# Patient Record
Sex: FEMALE | Race: BLACK OR AFRICAN AMERICAN
[De-identification: names, ages, dates, MRNs, and addresses within clinical notes are randomized per-mention and may not be internally consistent; named-entity substitution may affect disease eponyms.]

---

## 2019-03-15 ENCOUNTER — HOSPITAL ENCOUNTER (EMERGENCY)
Dept: HOSPITAL 87 - ER | Age: 29
Discharge: LEFT BEFORE DISCHARGE | End: 2019-03-15
Payer: COMMERCIAL

## 2019-03-15 VITALS — BODY MASS INDEX: 29.76 KG/M2 | WEIGHT: 185.19 LBS | HEIGHT: 66 IN

## 2019-03-15 VITALS — DIASTOLIC BLOOD PRESSURE: 85 MMHG | SYSTOLIC BLOOD PRESSURE: 131 MMHG

## 2019-03-15 DIAGNOSIS — R42: Primary | ICD-10-CM

## 2019-03-15 DIAGNOSIS — Z53.21: ICD-10-CM

## 2024-03-18 ENCOUNTER — HOSPITAL ENCOUNTER (EMERGENCY)
Facility: HOSPITAL | Age: 34
Discharge: HOME | End: 2024-03-18
Attending: EMERGENCY MEDICINE
Payer: MEDICAID

## 2024-03-18 VITALS
DIASTOLIC BLOOD PRESSURE: 82 MMHG | RESPIRATION RATE: 17 BRPM | WEIGHT: 250 LBS | TEMPERATURE: 99 F | OXYGEN SATURATION: 100 % | SYSTOLIC BLOOD PRESSURE: 131 MMHG | HEART RATE: 88 BPM

## 2024-03-18 DIAGNOSIS — H92.02 OTALGIA OF LEFT EAR: Primary | ICD-10-CM

## 2024-03-18 LAB
CTP QC/QA: YES
MOLECULAR STREP A: NEGATIVE
POC MOLECULAR INFLUENZA A AGN: NEGATIVE
POC MOLECULAR INFLUENZA B AGN: NEGATIVE
SARS-COV-2 RDRP RESP QL NAA+PROBE: NEGATIVE

## 2024-03-18 PROCEDURE — 87502 INFLUENZA DNA AMP PROBE: CPT

## 2024-03-18 PROCEDURE — 87651 STREP A DNA AMP PROBE: CPT

## 2024-03-18 PROCEDURE — 87635 SARS-COV-2 COVID-19 AMP PRB: CPT | Performed by: EMERGENCY MEDICINE

## 2024-03-18 PROCEDURE — 99282 EMERGENCY DEPT VISIT SF MDM: CPT

## 2024-03-18 RX ORDER — CETIRIZINE HYDROCHLORIDE 10 MG/1
10 TABLET ORAL DAILY
Qty: 30 TABLET | Refills: 0 | Status: SHIPPED | OUTPATIENT
Start: 2024-03-18 | End: 2025-03-18

## 2024-03-18 RX ORDER — PSEUDOEPHEDRINE HCL 120 MG/1
120 TABLET, FILM COATED, EXTENDED RELEASE ORAL 2 TIMES DAILY PRN
Qty: 20 TABLET | Refills: 0 | Status: SHIPPED | OUTPATIENT
Start: 2024-03-18 | End: 2024-03-28

## 2024-03-18 RX ORDER — FLUTICASONE PROPIONATE 50 MCG
2 SPRAY, SUSPENSION (ML) NASAL DAILY
Qty: 15.8 ML | Refills: 0 | Status: SHIPPED | OUTPATIENT
Start: 2024-03-18 | End: 2024-04-17

## 2024-03-18 NOTE — DISCHARGE INSTRUCTIONS

## 2024-03-18 NOTE — ED PROVIDER NOTES
Encounter Date: 3/18/2024    SCRIBE #1 NOTE: I Sara Li, am scribing for, and in the presence of,  Franko Valencia PA-C.       History     Chief Complaint   Patient presents with    Sore Throat     Sore throat, cough, runny nose, L ear pain x3 weeks     32 yo F w/ no PMHx presenting to the ED for 3w hx of persistent sore throat and L ear pain. Also notes nasal congestion. Was evaluated at [facility] in 3/9/24 and was told she had a URI after testing negative for COVID, flu, RSV. She has been attempting Tx w/ tylenol w/out relief. No other exacerbating or alleviating factors. Denies fever, chest pain, dyspnea, or other associated symptoms.  Patient was concerned that she has a Candida auris infection because her cousin told her to ask about it and she has been Googling it.    The history is provided by the patient. No  was used.     Review of patient's allergies indicates:  No Known Allergies  No past medical history on file.  No past surgical history on file.  No family history on file.     Review of Systems   Constitutional:  Negative for chills and fever.   HENT:  Positive for congestion, ear pain and sore throat. Negative for rhinorrhea.    Eyes:  Negative for visual disturbance.   Respiratory:  Negative for cough and shortness of breath.    Cardiovascular:  Negative for chest pain.   Gastrointestinal:  Negative for abdominal pain, diarrhea, nausea and vomiting.   Genitourinary:  Negative for dysuria, frequency and hematuria.   Musculoskeletal:  Negative for back pain.   Skin:  Negative for rash.   Neurological:  Negative for dizziness, weakness and headaches.       Physical Exam     Initial Vitals [03/18/24 1343]   BP Pulse Resp Temp SpO2   131/82 88 17 98.7 °F (37.1 °C) 100 %      MAP       --         Physical Exam    Nursing note and vitals reviewed.  Constitutional: Vital signs are normal. She appears well-developed and well-nourished. She is cooperative. She does not appear ill. No  distress.   Clinically well-appearing.  No acute distress.   HENT:   Head: Normocephalic and atraumatic.   Right Ear: Hearing and external ear normal.   Left Ear: Hearing and external ear normal.   Nose: Nose normal.   Mouth/Throat: Oropharynx is clear and moist and mucous membranes are normal. Mucous membranes are not dry. No oropharyngeal exudate, posterior oropharyngeal edema or posterior oropharyngeal erythema.   Normal exam of the oropharynx.  No oropharyngeal or tonsillar erythema, edema, or exudates.  Bilateral ears clear with no signs of infection.  No middle ear effusion.  Tympanic membranes intact.   Eyes: Conjunctivae and EOM are normal.   Neck: Phonation normal.   Normal range of motion.  Cardiovascular:  Normal rate and regular rhythm.           No murmur heard.  Pulmonary/Chest: Effort normal. No respiratory distress.   Respirations even and unlabored.   Abdominal: Abdomen is soft. She exhibits no distension. There is no abdominal tenderness.   Musculoskeletal:      Cervical back: Normal range of motion.     Neurological: She is alert and oriented to person, place, and time. GCS eye subscore is 4. GCS verbal subscore is 5. GCS motor subscore is 6.   Skin: Skin is warm. Capillary refill takes less than 2 seconds.         ED Course   Procedures  Labs Reviewed   SARS-COV-2 RDRP GENE   POCT INFLUENZA A/B MOLECULAR   POCT STREP A MOLECULAR          Imaging Results    None          Medications - No data to display  Medical Decision Making  33-year-old female presenting to the emergency department with a chief complaint of 3 weeks of sore throat and left ear pain.  Tylenol with no relief.  Denies fever or systemic symptoms.  Concerned for Candida auris infection.  On physical exam, she was clinically well-appearing and in no acute distress.  Vital signs are within normal limits.  Cardiac and lung exams within normal limits.  Examination of the ears and oropharynx unremarkable.      Differential diagnosis  includes but is not limited to strep pharyngitis, viral pharyngitis, peritonsillar abscess, respiratory infections including COVID, flu, bronchitis, rhinosinusitis, or pneumonia, or noninfectious processes such as asthma, COPD or seasonal allergies.    Patient tested negative for COVID, flu, and strep.  No convincing signs of bacterial infection.  No fever.  Likely post viral, or due to seasonal allergies and congestion.  I discussed with the patient that because she was immunocompetent and not systemically ill, the chances of her having a Candida auris infection are very minimal.  She seemed reassured by this.  Medications below electronically prescribed and sent to the patient's preferred pharmacy.  Stable for discharge.    Return precautions were discussed, all patient questions were answered, and the patient was agreeable to the plan of care.  She was discharged home in stable condition and will follow up with her primary care provider or return to the emergency department if her symptoms worsen or do not improve.     Amount and/or Complexity of Data Reviewed  Labs: ordered. Decision-making details documented in ED Course.    Risk  OTC drugs.  Diagnosis or treatment significantly limited by social determinants of health.            Scribe Attestation:   Scribe #1: I performed the above scribed service and the documentation accurately describes the services I performed. I attest to the accuracy of the note.                           I, TESSA Yanes-, personally performed the services described in this documentation. All medical record entries made by the scribe were at my direction and in my presence. I have reviewed the chart and agree that the record reflects my personal performance and is accurate and complete.      Clinical Impression:  Final diagnoses:  [H92.02] Otalgia of left ear (Primary)          ED Disposition Condition    Discharge Stable          ED Prescriptions       Medication Sig Dispense  Start Date End Date Auth. Provider    fluticasone propionate (FLONASE) 50 mcg/actuation nasal spray 2 sprays (100 mcg total) by Each Nostril route once daily. 15.8 mL 3/18/2024 4/17/2024 Franko Valencia PA-C    cetirizine (ZYRTEC) 10 MG tablet Take 1 tablet (10 mg total) by mouth once daily. 30 tablet 3/18/2024 3/18/2025 Franko Valencia PA-C    benzocaine-menthoL 6-10 mg lozenge Take 1 lozenge by mouth every 2 (two) hours as needed. 36 tablet 3/18/2024 -- Franko Valencia PA-C    pseudoephedrine (SUDAFED 12 HOUR) 120 mg 12 hr tablet Take 1 tablet (120 mg total) by mouth 2 (two) times daily as needed for Congestion. 20 tablet 3/18/2024 3/28/2024 Franko Valencia, BECKY          Follow-up Information       Follow up With Specialties Details Why Contact Info    [facility] -  Schedule an appointment as soon as possible for a visit  As needed [address]  [place] [address]  [phone]               Franko Valencia PA-C  03/18/24 7961

## 2024-05-15 ENCOUNTER — HOSPITAL ENCOUNTER (EMERGENCY)
Facility: HOSPITAL | Age: 34
Discharge: HOME | End: 2024-05-15
Attending: EMERGENCY MEDICINE
Payer: MEDICAID

## 2024-05-15 VITALS
HEART RATE: 122 BPM | RESPIRATION RATE: 18 BRPM | HEIGHT: 71 IN | WEIGHT: 248 LBS | OXYGEN SATURATION: 98 % | TEMPERATURE: 100 F | DIASTOLIC BLOOD PRESSURE: 82 MMHG | SYSTOLIC BLOOD PRESSURE: 134 MMHG | BODY MASS INDEX: 34.72 KG/M2

## 2024-05-15 DIAGNOSIS — J02.0 STREP PHARYNGITIS: Primary | ICD-10-CM

## 2024-05-15 DIAGNOSIS — R05.9 COUGH: ICD-10-CM

## 2024-05-15 LAB
B-HCG UR QL: NEGATIVE
CTP QC/QA: YES
MOLECULAR STREP A: POSITIVE
POC MOLECULAR INFLUENZA A AGN: NEGATIVE
POC MOLECULAR INFLUENZA B AGN: NEGATIVE
SARS-COV-2 RDRP RESP QL NAA+PROBE: NEGATIVE

## 2024-05-15 PROCEDURE — 87651 STREP A DNA AMP PROBE: CPT

## 2024-05-15 PROCEDURE — 87635 SARS-COV-2 COVID-19 AMP PRB: CPT | Performed by: STUDENT IN AN ORGANIZED HEALTH CARE EDUCATION/TRAINING PROGRAM

## 2024-05-15 PROCEDURE — 99283 EMERGENCY DEPT VISIT LOW MDM: CPT | Mod: 25

## 2024-05-15 PROCEDURE — 81025 URINE PREGNANCY TEST: CPT | Performed by: STUDENT IN AN ORGANIZED HEALTH CARE EDUCATION/TRAINING PROGRAM

## 2024-05-15 PROCEDURE — 87502 INFLUENZA DNA AMP PROBE: CPT

## 2024-05-15 PROCEDURE — 25000003 PHARM REV CODE 250: Performed by: PHYSICIAN ASSISTANT

## 2024-05-15 RX ORDER — IBUPROFEN 600 MG/1
600 TABLET ORAL
Status: COMPLETED | OUTPATIENT
Start: 2024-05-15 | End: 2024-05-15

## 2024-05-15 RX ORDER — ACETAMINOPHEN 500 MG
1000 TABLET ORAL
Status: COMPLETED | OUTPATIENT
Start: 2024-05-15 | End: 2024-05-15

## 2024-05-15 RX ORDER — AMOXICILLIN 250 MG/1
1000 CAPSULE ORAL
Status: COMPLETED | OUTPATIENT
Start: 2024-05-15 | End: 2024-05-15

## 2024-05-15 RX ORDER — AMOXICILLIN 500 MG/1
1000 CAPSULE ORAL DAILY
Qty: 20 CAPSULE | Refills: 0 | Status: SHIPPED | OUTPATIENT
Start: 2024-05-15 | End: 2024-05-25

## 2024-05-15 RX ORDER — ACETAMINOPHEN 325 MG/1
650 TABLET ORAL EVERY 6 HOURS PRN
Qty: 20 TABLET | Refills: 0 | Status: SHIPPED | OUTPATIENT
Start: 2024-05-15 | End: 2024-05-18

## 2024-05-15 RX ADMIN — IBUPROFEN 600 MG: 600 TABLET ORAL at 03:05

## 2024-05-15 RX ADMIN — ACETAMINOPHEN 1000 MG: 500 TABLET ORAL at 03:05

## 2024-05-15 RX ADMIN — AMOXICILLIN 1000 MG: 250 CAPSULE ORAL at 03:05

## 2024-05-15 NOTE — DISCHARGE INSTRUCTIONS
Thank you for coming to our Emergency Department today. It is important to remember that some problems or medical conditions are difficult to diagnose and may not be found or addressed during your Emergency Department visit.  These conditions often start with non-specific symptoms and can only be diagnosed on follow up visits with your primary care physician or specialist when the symptoms continue or change. Please remember that all medical conditions can change, and we cannot predict how you will be feeling tomorrow or the next day. Return to the ER with any questions/concerns, new/concerning symptoms, worsening or failure to improve.       Be sure to follow up with your primary care doctor and review all labs/imaging/tests that were performed during your ER visit with them. It is very common for us to identify non-emergent incidental findings which must be followed up with your primary care physician.  Some labs/imaging/tests may be outside of the normal range, and require non-emergent follow-up and/or further investigation/treatment/procedures/testing to help diagnose/exclude/prevent complications or other potentially serious medical conditions. Some abnormalities may not have been discussed or addressed during your ER visit.     An ER visit does not replace a primary care visit, and many screening tests or follow-up tests cannot be ordered by an ER doctor or performed by the ER. Some tests may even require pre-approval.    If you do not have a primary care doctor, you may contact the one listed on your discharge paperwork or you may also call the [facility] Appointment Desk at [phone] , or [address] [place] [address] at  [phone] to schedule an appointment, or establish care with a primary care doctor or even a specialist and to obtain information about local resources. It is important to your health that you have a primary care doctor.    Please take all medications as directed. We have done our best to select  a medication for you that will treat your condition however, all medications may potentially have side-effects and it is impossible to predict which medications may give you side-effects or what those side-effects (if any) those medications may give you.  If you feel that you are having a negative effect or side-effect of any medication you should stop taking those medications immediately and seek medical attention. If you feel that you are having a life-threatening reaction call 911.        Do not drive, swim, climb to height, take a bath, operate heavy machinery, drink alcohol or take potentially sedating medications, sign any legal documents or make any important decisions for 24 hours if you have received any pain medications, sedatives or mood altering drugs during your ER visit or within 24 hours of taking them if they have been prescribed to you.     You can find additional resources for Dentists, hearing aids, durable medical equipment, low cost pharmacies and other resources at [email or web address]

## 2024-05-15 NOTE — ED PROVIDER NOTES
Encounter Date: 5/15/2024       History     Chief Complaint   Patient presents with    Generalized Body Aches     Pt c/o congestion, fever, generalized body aches and headache starting yesterday. Took Ibuprofen at 0730 this morning.        33-year-old female with no pertinent past medical history presents to the emergency department for pharyngitis associated with fever.  Symptoms began yesterday.  Mild associated cough.  Denies nausea and vomiting.  Denies abdominal pain, chest pain, and shortness of breath.  No medication prior to arrival.    The history is provided by the patient.     Review of patient's allergies indicates:  No Known Allergies  No past medical history on file.  No past surgical history on file.  No family history on file.     Review of Systems   Constitutional:  Positive for fever.   HENT:  Positive for sore throat. Negative for congestion and trouble swallowing.    Respiratory:  Positive for cough. Negative for shortness of breath.    Cardiovascular:  Negative for chest pain.   Gastrointestinal:  Negative for abdominal pain, constipation, diarrhea, nausea and vomiting.   Genitourinary:  Negative for dysuria, flank pain, frequency and urgency.   Musculoskeletal:  Negative for back pain.   Skin:  Negative for rash.   Neurological:  Negative for headaches.   All other systems reviewed and are negative.      Physical Exam     Initial Vitals [05/15/24 1447]   BP Pulse Resp Temp SpO2   138/86 (!) 126 18 (!) 103.6 °F (39.8 °C) 98 %      MAP       --         Physical Exam    Nursing note and vitals reviewed.  Constitutional: She appears well-developed and well-nourished. She is not diaphoretic. No distress.   HENT:   Head: Atraumatic.   Right Ear: External ear normal.   Left Ear: External ear normal.     Mild erythema to posterior oropharynx that is otherwise normal.  No uvular deviation, trismus, or drooling.  No exudates.  No meningismus.     Eyes: Conjunctivae and EOM are normal.   Neck: No tracheal  deviation present.   Normal range of motion.  Cardiovascular:  Normal rate and regular rhythm.           Pulmonary/Chest: No accessory muscle usage or stridor. No tachypnea. No respiratory distress.   Musculoskeletal:         General: No edema. Normal range of motion.      Cervical back: Normal range of motion.     Neurological: She is alert and oriented to person, place, and time. She displays no tremor. She displays no seizure activity. Coordination and gait normal.   Skin: Skin is intact. Capillary refill takes less than 2 seconds. No rash noted. No erythema.         ED Course   Procedures  Labs Reviewed   POCT STREP A MOLECULAR - Abnormal; Notable for the following components:       Result Value    Molecular Strep A, POC Positive (*)     All other components within normal limits   POCT URINE PREGNANCY   POCT INFLUENZA A/B MOLECULAR   SARS-COV-2 RDRP GENE          Imaging Results              X-Ray Chest AP Portable (Final result)  Result time 05/15/24 15:10:47      Final result by Greg Clements MD (05/15/24 15:10:47)                   Impression:      See above      Electronically signed by: Greg Clements MD  Date:    05/15/2024  Time:    15:10               Narrative:    EXAMINATION:  XR CHEST AP PORTABLE    CLINICAL HISTORY:  Cough, unspecified    TECHNIQUE:  Single frontal view of the chest was performed.    COMPARISON:  None    FINDINGS:  Cardiac size is normal.  Lungs are clear.  No infiltrate is seen.                                       Medications   acetaminophen tablet 1,000 mg (1,000 mg Oral Given 5/15/24 1514)   ibuprofen tablet 600 mg (600 mg Oral Given 5/15/24 1514)   amoxicillin capsule 1,000 mg (1,000 mg Oral Given 5/15/24 1545)     Medical Decision Making    This is an emergent evaluation of a 33 y.o. female presenting to the ED for sore throat. Denies changes in phonation/muffled voice, drooling, purulent rhinorrhea, sinus tenderness, SOB, abdominal pain, and rash. No nausea or  vomiting. Patient is non-toxic appearing and in no acute distress.     Rapid strep (+). No clinical evidence of PTA, retropharyngeal abscess, epiglottitis, meningitis, sinusitis, AOM, mastoiditis, bacterial PNA. Low risk for gonococcal pharyngitis. Unlikely to have concurrent mononucleosis. I feel patient is at low risk for rheumatic fever and post-streptococcal glomerulonephritis as a complication at this time.     Abx started. Discharged home with instructions to maintain adequate hydration. We discuss management with OTC medications. Instructed to follow up with PCP for reevaluation and management of symptoms. An educational handout on strep throat is provided.     I discussed with the patient the diagnosis, treatment plan, indications for return to the emergency department, and for expected follow-up. The patient verbalized an understanding. The patient is asked if there are any questions or concerns. We discuss the case, until all issues are addressed to the patient's satisfaction. Patient understands and is agreeable to the plan.       Amount and/or Complexity of Data Reviewed  Radiology: ordered.    Risk  OTC drugs.  Prescription drug management.                                      Clinical Impression:  Final diagnoses:  [R05.9] Cough  [J02.0] Strep pharyngitis (Primary)          ED Disposition Condition    Discharge Stable          ED Prescriptions       Medication Sig Dispense Start Date End Date Auth. Provider    amoxicillin (AMOXIL) 500 MG capsule Take 2 capsules (1,000 mg total) by mouth once daily. for 10 days 20 capsule 5/15/2024 5/25/2024 Edgar Gastelum PA-C    benzocaine-menthoL 15-20 mg Lozg Follows directions on packaging 16 lozenge 5/15/2024 -- Edgar Gastelum PA-C    acetaminophen (TYLENOL) 325 MG tablet Take 2 tablets (650 mg total) by mouth every 6 (six) hours as needed for Pain. 20 tablet 5/15/2024 5/18/2024 Edgar Gastelum PA-C          Follow-up Information       Follow up With  Specialties Details Why Contact Info    [facility] -  Schedule an appointment as soon as possible for a visit in 1 day For reevaluation, AND to establish primary if you don't have a  [address] [place] [address]  [phone]      [facility] Emergency Medicine Go to  If symptoms worsen or new symptoms develop [address] [facility] [place] [id number] [phone]             Edgar Gastelum, PAChayC  05/15/24 1848